# Patient Record
Sex: FEMALE | Race: WHITE | NOT HISPANIC OR LATINO | URBAN - METROPOLITAN AREA
[De-identification: names, ages, dates, MRNs, and addresses within clinical notes are randomized per-mention and may not be internally consistent; named-entity substitution may affect disease eponyms.]

---
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
## 2023-01-24 ENCOUNTER — APPOINTMENT (RX ONLY)
Dept: URBAN - METROPOLITAN AREA CLINIC 105 | Facility: CLINIC | Age: 18
Setting detail: DERMATOLOGY
End: 2023-01-24

## 2023-01-24 DIAGNOSIS — Z79.899 OTHER LONG TERM (CURRENT) DRUG THERAPY: ICD-10-CM

## 2023-01-24 DIAGNOSIS — L70.0 ACNE VULGARIS: ICD-10-CM | Status: IMPROVED

## 2023-01-24 PROCEDURE — ? COUNSELING

## 2023-01-24 PROCEDURE — 99213 OFFICE O/P EST LOW 20 MIN: CPT

## 2023-01-24 PROCEDURE — 81025 URINE PREGNANCY TEST: CPT

## 2023-01-24 PROCEDURE — ? COUNSELING: ISOTRETINOIN

## 2023-01-24 PROCEDURE — ? MEDICATION COUNSELING

## 2023-01-24 PROCEDURE — ? PRESCRIPTION

## 2023-01-24 PROCEDURE — ? ISOTRETINOIN MONITORING

## 2023-01-24 PROCEDURE — ? URINE PREGNANCY TEST

## 2023-01-24 RX ORDER — ISOTRETINOIN 30 MG/1
CAPSULE, LIQUID FILLED ORAL
Qty: 60 | Refills: 0 | Status: ERX

## 2023-01-24 ASSESSMENT — LOCATION SIMPLE DESCRIPTION DERM: LOCATION SIMPLE: LEFT CHEEK

## 2023-01-24 ASSESSMENT — LOCATION ZONE DERM: LOCATION ZONE: FACE

## 2023-01-24 ASSESSMENT — LOCATION DETAILED DESCRIPTION DERM: LOCATION DETAILED: LEFT INFERIOR CENTRAL MALAR CHEEK

## 2023-01-24 NOTE — PROCEDURE: ISOTRETINOIN MONITORING
Xerosis Aggressive Treatment: I recommended application of Cetaphil or CeraVe numerous times a day going to bed to all dry areas. I also prescribed a topical steroid for twice daily use.
Show Text Field For Brand Names Of Contraception?: Yes
Most Recent Cholesterol (Optional): 205
Cheilitis Aggressive Treatment: I recommended application of Vaseline or Aquaphor numerous times a day (as often as every hour) and before going to bed. I also prescribed a topical steroid for twice daily use.
Weight Units: pounds
Myalgia Monitoring: I explained this is common when taking isotretinoin. If this worsens they will contact us.
Completed Therapy?: No
Nosebleeds Normal Treatment: I explained this is common when taking isotretinoin. I recommended saline mist in each nostril multiple times a day. If this worsens they will contact us.
Xerosis Aggressive Treatment: I recommended application of Cetaphil or CeraVe numerous times a day and before going to bed to all dry areas. I also prescribed a topical steroid for twice daily use.
Is Cheilitis Present?: Yes - Normal Treatment
Female Completion Statement: After discussing her treatment course we decided to discontinue isotretinoin therapy at this time. I explained that she would need to continue her birth control methods for at least one month after the last dosage. She should also get a pregnancy test one month after the last dose. She shouldn't donate blood for one month after the last dose. She should call with any new symptoms of depression.
Upper Range (In Mg/Kg): 150
Retinoid Dermatitis Aggressive Treatment: I recommended more frequent application of Cetaphil or CeraVe to the areas of dermatitis. I also prescribed a topical steroid for twice daily use until the dermatitis resolves.
Next Month's Dosage: Continue Current Dosage
Target Cumulative Dosage (In Mg/Kg): 135
Months Of Therapy Completed: 3
Hypercholesterolemia Monitoring: I explained this is common when taking isotretinoin. We will monitor closely.
Counseling Text: I reviewed the side effect in detail. Patient should get monthly blood tests, not donate blood, not drive at night if vision affected, and not share medication.
Female Pregnancy Counseling Text: Female patients should also be on two forms of birth control while taking this medication and for one month after their last dose.
Myalgia Treatment: I explained this is common when taking isotretinoin. If this worsens they will contact us. They may try OTC ibuprofen.
Retinoid Dermatitis Normal Treatment: I recommended more frequent application of Cetaphil or CeraVe to the areas of dermatitis.
Headache Monitoring: I recommended monitoring the headaches for now. There is no evidence of increased intracranial pressure. They were instructed to call if the headaches are worsening.
Lower Range (In Mg/Kg): 120
Most Recent Triglycerides (Optional): 106
Use Therapeutic Ranged Or Therapeutic Target: please select Range or Target
Pounds Preamble Statement (Weight Entered In Details Tab): Reported Weight in pounds:
Cheilitis Normal Treatment: I recommended application of Vaseline or Aquaphor numerous times a day (as often as every hour) and before going to bed.
Xerosis Normal Treatment: I recommended application of Cetaphil or CeraVe numerous times a day and before going to bed to all dry areas.
What Is The Patient's Gender: Female
Kilograms Preamble Statement (Weight Entered In Details Tab): Reported Weight in kilograms:
Detail Level: Zone
Male Completion Statement: After discussing his treatment course we decided to discontinue isotretinoin therapy at this time. He shouldn't donate blood for one month after the last dose. He should call with any new symptoms of depression.
Xerosis Normal Treatment: I recommended application of Cetaphil or CeraVe numerous times a day going to bed to all dry areas.

## 2023-01-24 NOTE — PROCEDURE: URINE PREGNANCY TEST
Performed By: Belén Dunlap MA
Expiration Date (Optional): 04/28/2024
Detail Level: None
Urine Pregnancy Test Result: negative
Lot # (Optional): [id number]

## 2023-03-02 ENCOUNTER — APPOINTMENT (RX ONLY)
Dept: URBAN - METROPOLITAN AREA CLINIC 105 | Facility: CLINIC | Age: 18
Setting detail: DERMATOLOGY
End: 2023-03-02

## 2023-03-02 DIAGNOSIS — Z79.899 OTHER LONG TERM (CURRENT) DRUG THERAPY: ICD-10-CM

## 2023-03-02 DIAGNOSIS — L70.0 ACNE VULGARIS: ICD-10-CM

## 2023-03-02 PROCEDURE — 81025 URINE PREGNANCY TEST: CPT

## 2023-03-02 PROCEDURE — 99213 OFFICE O/P EST LOW 20 MIN: CPT

## 2023-03-02 PROCEDURE — ? ISOTRETINOIN MONITORING

## 2023-03-02 PROCEDURE — ? URINE PREGNANCY TEST

## 2023-03-02 PROCEDURE — ? PRESCRIPTION

## 2023-03-02 PROCEDURE — ? COUNSELING: ISOTRETINOIN

## 2023-03-02 PROCEDURE — ? COUNSELING

## 2023-03-02 PROCEDURE — ? MEDICATION COUNSELING

## 2023-03-02 RX ORDER — ISOTRETINOIN 30 MG/1
CAPSULE, LIQUID FILLED ORAL
Qty: 60 | Refills: 0 | Status: ERX

## 2023-03-02 ASSESSMENT — LOCATION DETAILED DESCRIPTION DERM: LOCATION DETAILED: LEFT INFERIOR CENTRAL MALAR CHEEK

## 2023-03-02 ASSESSMENT — LOCATION ZONE DERM: LOCATION ZONE: FACE

## 2023-03-02 ASSESSMENT — LOCATION SIMPLE DESCRIPTION DERM: LOCATION SIMPLE: LEFT CHEEK

## 2023-03-02 NOTE — PROCEDURE: URINE PREGNANCY TEST
Performed By: Belén Dunlap MA
Expiration Date (Optional): 06/01/2024
Detail Level: None
Urine Pregnancy Test Result: negative
Lot # (Optional): [id number]

## 2023-03-02 NOTE — PROCEDURE: ISOTRETINOIN MONITORING
Xerosis Aggressive Treatment: I recommended application of Cetaphil or CeraVe numerous times a day going to bed to all dry areas. I also prescribed a topical steroid for twice daily use.
Show Text Field For Brand Names Of Contraception?: Yes
Most Recent Cholesterol (Optional): 205
Cheilitis Aggressive Treatment: I recommended application of Vaseline or Aquaphor numerous times a day (as often as every hour) and before going to bed. I also prescribed a topical steroid for twice daily use.
Weight Units: pounds
Myalgia Monitoring: I explained this is common when taking isotretinoin. If this worsens they will contact us.
Completed Therapy?: No
Nosebleeds Normal Treatment: I explained this is common when taking isotretinoin. I recommended saline mist in each nostril multiple times a day. If this worsens they will contact us.
Xerosis Aggressive Treatment: I recommended application of Cetaphil or CeraVe numerous times a day and before going to bed to all dry areas. I also prescribed a topical steroid for twice daily use.
Is Cheilitis Present?: Yes - Normal Treatment
Female Completion Statement: After discussing her treatment course we decided to discontinue isotretinoin therapy at this time. I explained that she would need to continue her birth control methods for at least one month after the last dosage. She should also get a pregnancy test one month after the last dose. She shouldn't donate blood for one month after the last dose. She should call with any new symptoms of depression.
Upper Range (In Mg/Kg): 150
Retinoid Dermatitis Aggressive Treatment: I recommended more frequent application of Cetaphil or CeraVe to the areas of dermatitis. I also prescribed a topical steroid for twice daily use until the dermatitis resolves.
Next Month's Dosage: Continue Current Dosage
Target Cumulative Dosage (In Mg/Kg): 135
Months Of Therapy Completed: 4
Hypercholesterolemia Monitoring: I explained this is common when taking isotretinoin. We will monitor closely.
Counseling Text: I reviewed the side effect in detail. Patient should get monthly blood tests, not donate blood, not drive at night if vision affected, and not share medication.
Female Pregnancy Counseling Text: Female patients should also be on two forms of birth control while taking this medication and for one month after their last dose.
Myalgia Treatment: I explained this is common when taking isotretinoin. If this worsens they will contact us. They may try OTC ibuprofen.
Retinoid Dermatitis Normal Treatment: I recommended more frequent application of Cetaphil or CeraVe to the areas of dermatitis.
Headache Monitoring: I recommended monitoring the headaches for now. There is no evidence of increased intracranial pressure. They were instructed to call if the headaches are worsening.
Lower Range (In Mg/Kg): 120
Most Recent Triglycerides (Optional): 106
Use Therapeutic Ranged Or Therapeutic Target: please select Range or Target
Pounds Preamble Statement (Weight Entered In Details Tab): Reported Weight in pounds:
Cheilitis Normal Treatment: I recommended application of Vaseline or Aquaphor numerous times a day (as often as every hour) and before going to bed.
Xerosis Normal Treatment: I recommended application of Cetaphil or CeraVe numerous times a day and before going to bed to all dry areas.
What Is The Patient's Gender: Female
Kilograms Preamble Statement (Weight Entered In Details Tab): Reported Weight in kilograms:
Detail Level: Zone
Male Completion Statement: After discussing his treatment course we decided to discontinue isotretinoin therapy at this time. He shouldn't donate blood for one month after the last dose. He should call with any new symptoms of depression.
Xerosis Normal Treatment: I recommended application of Cetaphil or CeraVe numerous times a day going to bed to all dry areas.

## 2023-04-10 ENCOUNTER — APPOINTMENT (RX ONLY)
Dept: URBAN - METROPOLITAN AREA CLINIC 105 | Facility: CLINIC | Age: 18
Setting detail: DERMATOLOGY
End: 2023-04-10

## 2023-04-10 DIAGNOSIS — L70.0 ACNE VULGARIS: ICD-10-CM

## 2023-04-10 DIAGNOSIS — Z79.899 OTHER LONG TERM (CURRENT) DRUG THERAPY: ICD-10-CM

## 2023-04-10 PROCEDURE — 81025 URINE PREGNANCY TEST: CPT

## 2023-04-10 PROCEDURE — ? COUNSELING

## 2023-04-10 PROCEDURE — ? ISOTRETINOIN MONITORING

## 2023-04-10 PROCEDURE — ? COUNSELING: ISOTRETINOIN

## 2023-04-10 PROCEDURE — ? URINE PREGNANCY TEST

## 2023-04-10 PROCEDURE — ? PRESCRIPTION

## 2023-04-10 PROCEDURE — 99213 OFFICE O/P EST LOW 20 MIN: CPT

## 2023-04-10 PROCEDURE — ? MEDICATION COUNSELING

## 2023-04-10 RX ORDER — ISOTRETINOIN 30 MG/1
CAPSULE, LIQUID FILLED ORAL
Qty: 60 | Refills: 0 | Status: ERX

## 2023-04-10 ASSESSMENT — LOCATION ZONE DERM: LOCATION ZONE: FACE

## 2023-04-10 ASSESSMENT — LOCATION SIMPLE DESCRIPTION DERM: LOCATION SIMPLE: LEFT CHEEK

## 2023-04-10 ASSESSMENT — LOCATION DETAILED DESCRIPTION DERM: LOCATION DETAILED: LEFT INFERIOR CENTRAL MALAR CHEEK

## 2023-04-10 NOTE — PROCEDURE: ISOTRETINOIN MONITORING
Xerosis Aggressive Treatment: I recommended application of Cetaphil or CeraVe numerous times a day going to bed to all dry areas. I also prescribed a topical steroid for twice daily use.
Show Text Field For Brand Names Of Contraception?: Yes
Most Recent Cholesterol (Optional): 205
Cheilitis Aggressive Treatment: I recommended application of Vaseline or Aquaphor numerous times a day (as often as every hour) and before going to bed. I also prescribed a topical steroid for twice daily use.
Weight Units: pounds
Myalgia Monitoring: I explained this is common when taking isotretinoin. If this worsens they will contact us.
Completed Therapy?: No
Nosebleeds Normal Treatment: I explained this is common when taking isotretinoin. I recommended saline mist in each nostril multiple times a day. If this worsens they will contact us.
Xerosis Aggressive Treatment: I recommended application of Cetaphil or CeraVe numerous times a day and before going to bed to all dry areas. I also prescribed a topical steroid for twice daily use.
Is Cheilitis Present?: Yes - Normal Treatment
Female Completion Statement: After discussing her treatment course we decided to discontinue isotretinoin therapy at this time. I explained that she would need to continue her birth control methods for at least one month after the last dosage. She should also get a pregnancy test one month after the last dose. She shouldn't donate blood for one month after the last dose. She should call with any new symptoms of depression.
Upper Range (In Mg/Kg): 150
Retinoid Dermatitis Aggressive Treatment: I recommended more frequent application of Cetaphil or CeraVe to the areas of dermatitis. I also prescribed a topical steroid for twice daily use until the dermatitis resolves.
Next Month's Dosage: Continue Current Dosage
Target Cumulative Dosage (In Mg/Kg): 135
Months Of Therapy Completed: 5
Hypercholesterolemia Monitoring: I explained this is common when taking isotretinoin. We will monitor closely.
Counseling Text: I reviewed the side effect in detail. Patient should get monthly blood tests, not donate blood, not drive at night if vision affected, and not share medication.
Female Pregnancy Counseling Text: Female patients should also be on two forms of birth control while taking this medication and for one month after their last dose.
Myalgia Treatment: I explained this is common when taking isotretinoin. If this worsens they will contact us. They may try OTC ibuprofen.
Retinoid Dermatitis Normal Treatment: I recommended more frequent application of Cetaphil or CeraVe to the areas of dermatitis.
Headache Monitoring: I recommended monitoring the headaches for now. There is no evidence of increased intracranial pressure. They were instructed to call if the headaches are worsening.
Lower Range (In Mg/Kg): 120
Most Recent Triglycerides (Optional): 106
Use Therapeutic Ranged Or Therapeutic Target: please select Range or Target
Pounds Preamble Statement (Weight Entered In Details Tab): Reported Weight in pounds:
Cheilitis Normal Treatment: I recommended application of Vaseline or Aquaphor numerous times a day (as often as every hour) and before going to bed.
Xerosis Normal Treatment: I recommended application of Cetaphil or CeraVe numerous times a day and before going to bed to all dry areas.
What Is The Patient's Gender: Female
Kilograms Preamble Statement (Weight Entered In Details Tab): Reported Weight in kilograms:
Detail Level: Zone
Male Completion Statement: After discussing his treatment course we decided to discontinue isotretinoin therapy at this time. He shouldn't donate blood for one month after the last dose. He should call with any new symptoms of depression.
Xerosis Normal Treatment: I recommended application of Cetaphil or CeraVe numerous times a day going to bed to all dry areas.

## 2023-04-10 NOTE — PROCEDURE: URINE PREGNANCY TEST
Performed By: Belén Dunlap MA
Expiration Date (Optional): 06/01/2024
Detail Level: None
Urine Pregnancy Test Result: negative
Lot # (Optional): [id number]

## 2025-06-03 ENCOUNTER — APPOINTMENT (OUTPATIENT)
Dept: URBAN - METROPOLITAN AREA CLINIC 105 | Facility: CLINIC | Age: 20
Setting detail: DERMATOLOGY
End: 2025-06-03

## 2025-06-03 DIAGNOSIS — L65.0 TELOGEN EFFLUVIUM: ICD-10-CM

## 2025-06-03 PROCEDURE — 99213 OFFICE O/P EST LOW 20 MIN: CPT

## 2025-06-03 PROCEDURE — ? OTHER

## 2025-06-03 PROCEDURE — ? COUNSELING

## 2025-06-03 ASSESSMENT — LOCATION ZONE DERM: LOCATION ZONE: SCALP

## 2025-06-03 ASSESSMENT — LOCATION SIMPLE DESCRIPTION DERM: LOCATION SIMPLE: FRONTAL SCALP

## 2025-06-03 ASSESSMENT — LOCATION DETAILED DESCRIPTION DERM: LOCATION DETAILED: MEDIAL FRONTAL SCALP

## 2025-06-03 NOTE — PROCEDURE: OTHER
Other (Free Text): Prescription for compound sent to [facility].
Note Text (......Xxx Chief Complaint.): This diagnosis correlates with the
Render Risk Assessment In Note?: no
Detail Level: Zone